# Patient Record
Sex: MALE | Race: WHITE | Employment: OTHER | URBAN - METROPOLITAN AREA
[De-identification: names, ages, dates, MRNs, and addresses within clinical notes are randomized per-mention and may not be internally consistent; named-entity substitution may affect disease eponyms.]

---

## 2023-02-15 LAB
BUN / CREAT RATIO: NORMAL
BUN BLDV-MCNC: 14 MG/DL
CREAT SERPL-MCNC: 1.08 MG/DL

## 2023-07-27 ENCOUNTER — OFFICE VISIT (OUTPATIENT)
Age: 73
End: 2023-07-27
Payer: COMMERCIAL

## 2023-07-27 VITALS — WEIGHT: 238 LBS | HEIGHT: 72 IN | BODY MASS INDEX: 32.23 KG/M2

## 2023-07-27 DIAGNOSIS — Z85.46 PERSONAL HISTORY OF MALIGNANT NEOPLASM OF PROSTATE: ICD-10-CM

## 2023-07-27 DIAGNOSIS — N52.8 OTHER MALE ERECTILE DYSFUNCTION: Primary | ICD-10-CM

## 2023-07-27 LAB
BILIRUBIN, POC: NORMAL
BLOOD URINE, POC: NORMAL
CLARITY, POC: CLEAR
COLOR, POC: YELLOW
GLUCOSE URINE, POC: NORMAL
KETONES, POC: NORMAL
LEUKOCYTE EST, POC: NORMAL
NITRITE, POC: NORMAL
PH, POC: NORMAL
PROSTATE SPECIFIC ANTIGEN: 0.01 NG/ML
PROTEIN, POC: NORMAL
SPECIFIC GRAVITY, POC: NORMAL
UROBILINOGEN, POC: NORMAL

## 2023-07-27 PROCEDURE — 81003 URINALYSIS AUTO W/O SCOPE: CPT | Performed by: SPECIALIST

## 2023-07-27 PROCEDURE — 99213 OFFICE O/P EST LOW 20 MIN: CPT | Performed by: SPECIALIST

## 2023-07-27 PROCEDURE — 1123F ACP DISCUSS/DSCN MKR DOCD: CPT | Performed by: SPECIALIST

## 2023-07-27 RX ORDER — OMEPRAZOLE 20 MG/1
CAPSULE, DELAYED RELEASE ORAL
COMMUNITY
Start: 2023-07-16

## 2023-07-27 RX ORDER — APIXABAN 5 MG/1
TABLET, FILM COATED ORAL
COMMUNITY
Start: 2023-04-19

## 2023-07-27 NOTE — PROGRESS NOTES
[facility] Fall[facility], [address] [place] [address] Urology Office Progress Note    Patient:  Joya Schultz  YOB: 1950  Date: 7/27/2023    HISTORY OF PRESENT ILLNESS:   The patient is a 67 y.o. male  No evidence of prostate cancer recurrence s/p 9/30/05 open radical prostatectomy since PSA is 0.01. Patient has ED and has an Rx for generic sildenafil 20 mg (3 tabs) prn ED but he states he is not currently sexually active.   Lower urinary tract symptoms: frequency, hesitancy, and nocturia, 3 times per night   Last AUA Symptom Score (QOL): 10 (2)  Today's AUA Symptom Score (QOL): 5 (1)    Summary of old records:   Son played pro-hockey; grandson pro [employer]- going to [employer] [facility] in [facility]  History of prostate cancer: T1c T2c N0 G7; RRP 9/30/05 with neg margins  ED: Cialis 21 works well but too expensive; gave Rx for generic sildenafil 60 mg (three 20 mg tabs) prn ED  Rare LYLY    Additional History: none    Procedures Today: N/A    Urinalysis today:  Results for POC orders placed in visit on 07/27/23   POCT Urinalysis No Micro (Auto)   Result Value Ref Range    Color, UA yellow     Clarity, UA clear     Glucose, UA POC neg     Bilirubin, UA      Ketones, UA      Spec Grav, UA      Blood, UA POC neg     pH, UA      Protein, UA POC neg     Urobilinogen, UA      Leukocytes, UA neg     Nitrite, UA neg        Last several PSA's:  Lab Results   Component Value Date    PSA 0.01 07/27/2023    PSA 0.01 07/19/2022    PSA 0.01 07/12/2021       Last total testosterone:  No results found for: TESTOSTERONE    Last BUN and creatinine:  Lab Results   Component Value Date    BUN 14 02/15/2023     Lab Results   Component Value Date    CREATININE 1.08 02/15/2023       Last CBC:  No results found for: WBC, HGB, HCT, MCV, PLT    Additional Lab/Culture results: none    Imaging Reviewed during this Office Visit: none  (results were independently reviewed by physician and radiology report verified)    Physical Exam:    There were

## 2024-02-22 LAB
BUN / CREAT RATIO: NORMAL
BUN BLDV-MCNC: 19 MG/DL
CREAT SERPL-MCNC: 1.08 MG/DL

## 2024-08-14 LAB — PROSTATE SPECIFIC ANTIGEN: 0.01 NG/ML

## 2024-08-22 ENCOUNTER — OFFICE VISIT (OUTPATIENT)
Age: 74
End: 2024-08-22
Payer: COMMERCIAL

## 2024-08-22 DIAGNOSIS — N52.8 OTHER MALE ERECTILE DYSFUNCTION: Primary | ICD-10-CM

## 2024-08-22 DIAGNOSIS — Z85.46 PERSONAL HISTORY OF MALIGNANT NEOPLASM OF PROSTATE: ICD-10-CM

## 2024-08-22 LAB
BILIRUBIN, POC: NORMAL
BLOOD URINE, POC: NORMAL
CLARITY, POC: CLEAR
COLOR, POC: YELLOW
GLUCOSE URINE, POC: NORMAL
KETONES, POC: NORMAL
LEUKOCYTE EST, POC: NORMAL
NITRITE, POC: NORMAL
PH, POC: NORMAL
PROTEIN, POC: NORMAL
SPECIFIC GRAVITY, POC: NORMAL
UROBILINOGEN, POC: NORMAL

## 2024-08-22 PROCEDURE — 99214 OFFICE O/P EST MOD 30 MIN: CPT | Performed by: SPECIALIST

## 2024-08-22 PROCEDURE — 1123F ACP DISCUSS/DSCN MKR DOCD: CPT | Performed by: SPECIALIST

## 2024-08-22 PROCEDURE — 81003 URINALYSIS AUTO W/O SCOPE: CPT | Performed by: SPECIALIST

## 2024-08-22 RX ORDER — APIXABAN 2.5 MG/1
TABLET, FILM COATED ORAL
COMMUNITY
Start: 2024-07-09

## 2024-08-22 RX ORDER — SILDENAFIL 100 MG/1
100 TABLET, FILM COATED ORAL PRN
Qty: 30 TABLET | Refills: 11 | Status: SHIPPED | OUTPATIENT
Start: 2024-08-22

## 2024-08-22 RX ORDER — LISINOPRIL 30 MG/1
TABLET ORAL
COMMUNITY
Start: 2024-08-15

## 2024-08-22 NOTE — PROGRESS NOTES
Jack Oconnor MD [facility] Urology Office Progress Note    Patient:  Colt Shanks  YOB: 1950  Date: 8/22/2024    HISTORY OF PRESENT ILLNESS:   The patient is a 73 y.o. male  No evidence of prostate cancer recurrence s/p 9/30/05 open retropubic radical prostatectomy since PSA is 0.01.  Patient given an Rx for generic sildenafil 100 mg (1/2-1 tab) prn ED.   Lower urinary tract symptoms: urgency, frequency, hesitancy, nocturia, 1 times per night, and feelings of SAGAR    Last AUA Symptom Score (QOL): 5 (1)  Today's AUA Symptom Score (QOL): 8 (1)    Summary of old records:   Son played pro-hockey; grandson pro [employer]- going to [place] in [place]  History of prostate cancer: T1c T2c N0 G7; RRP 9/30/05 with neg margins  ED: Cialis 20 works well but too expensive; generic sildenafil 100 mg (1/2-1 tab) prn ED  Rare LYLY    Additional History: none    Procedures Today: N/A    Urinalysis today:  Results for POC orders placed in visit on 08/22/24   POCT Urinalysis No Micro (Auto)   Result Value Ref Range    Color, UA yellow     Clarity, UA clear     Glucose, UA POC neg     Bilirubin, UA      Ketones, UA      Spec Grav, UA      Blood, UA POC neg     pH, UA      Protein, UA POC neg     Urobilinogen, UA      Leukocytes, UA neg     Nitrite, UA neg        Last several PSA's:  Lab Results   Component Value Date    PSA 0.01 08/14/2024    PSA 0.01 07/27/2023    PSA 0.01 07/19/2022       Last total testosterone:  No results found for: \"TESTOSTERONE\"    Last BUN and creatinine:  Lab Results   Component Value Date    BUN 19 02/22/2024     Lab Results   Component Value Date    CREATININE 1.08 02/22/2024       Last CBC:  No results found for: \"WBC\", \"HGB\", \"HCT\", \"MCV\", \"PLT\"    Additional Lab/Culture results: none    Imaging Reviewed during this Office Visit: none  (results were independently reviewed by physician and radiology report verified)    Physical Exam:    There were no vitals filed for this

## 2025-08-27 ENCOUNTER — OFFICE VISIT (OUTPATIENT)
Age: 75
End: 2025-08-27
Payer: COMMERCIAL

## 2025-08-27 VITALS — HEIGHT: 72 IN | BODY MASS INDEX: 32.23 KG/M2 | WEIGHT: 238 LBS

## 2025-08-27 DIAGNOSIS — Z85.46 PERSONAL HISTORY OF MALIGNANT NEOPLASM OF PROSTATE: ICD-10-CM

## 2025-08-27 DIAGNOSIS — N52.8 OTHER MALE ERECTILE DYSFUNCTION: Primary | ICD-10-CM

## 2025-08-27 LAB
BILIRUBIN, POC: NORMAL
BLOOD URINE, POC: NORMAL
CLARITY, POC: CLEAR
COLOR, POC: YELLOW
GLUCOSE URINE, POC: NORMAL MG/DL
KETONES, POC: NORMAL
LEUKOCYTE EST, POC: NORMAL
NITRITE, POC: NORMAL
PH, POC: NORMAL
PROSTATE SPECIFIC ANTIGEN: 0.01 NG/ML
PROTEIN, POC: NORMAL MG/DL
SPECIFIC GRAVITY, POC: NORMAL
UROBILINOGEN, POC: NORMAL

## 2025-08-27 PROCEDURE — 1159F MED LIST DOCD IN RCRD: CPT | Performed by: SPECIALIST

## 2025-08-27 PROCEDURE — 1123F ACP DISCUSS/DSCN MKR DOCD: CPT | Performed by: SPECIALIST

## 2025-08-27 PROCEDURE — 99213 OFFICE O/P EST LOW 20 MIN: CPT | Performed by: SPECIALIST

## 2025-08-27 PROCEDURE — 81003 URINALYSIS AUTO W/O SCOPE: CPT | Performed by: SPECIALIST
